# Patient Record
Sex: FEMALE | Race: BLACK OR AFRICAN AMERICAN | Employment: OTHER | URBAN - METROPOLITAN AREA
[De-identification: names, ages, dates, MRNs, and addresses within clinical notes are randomized per-mention and may not be internally consistent; named-entity substitution may affect disease eponyms.]

---

## 2017-01-01 ENCOUNTER — HOSPITAL ENCOUNTER (EMERGENCY)
Age: 82
End: 2017-01-14
Attending: EMERGENCY MEDICINE | Admitting: FAMILY MEDICINE
Payer: MEDICARE

## 2017-01-01 ENCOUNTER — APPOINTMENT (OUTPATIENT)
Dept: GENERAL RADIOLOGY | Age: 82
End: 2017-01-01
Attending: EMERGENCY MEDICINE
Payer: MEDICARE

## 2017-01-01 ENCOUNTER — APPOINTMENT (OUTPATIENT)
Dept: CT IMAGING | Age: 82
End: 2017-01-01
Attending: FAMILY MEDICINE
Payer: MEDICARE

## 2017-01-01 VITALS
OXYGEN SATURATION: 47 % | WEIGHT: 89.9 LBS | RESPIRATION RATE: 7 BRPM | BODY MASS INDEX: 16.99 KG/M2 | TEMPERATURE: 97.7 F | DIASTOLIC BLOOD PRESSURE: 80 MMHG | SYSTOLIC BLOOD PRESSURE: 101 MMHG

## 2017-01-01 DIAGNOSIS — E87.8 HYPOCHLOREMIA: ICD-10-CM

## 2017-01-01 DIAGNOSIS — I46.9 CARDIAC ASYSTOLE (HCC): ICD-10-CM

## 2017-01-01 DIAGNOSIS — N17.9 ACUTE RENAL FAILURE, UNSPECIFIED ACUTE RENAL FAILURE TYPE (HCC): Primary | ICD-10-CM

## 2017-01-01 DIAGNOSIS — N39.0 URINARY TRACT INFECTION WITHOUT HEMATURIA, SITE UNSPECIFIED: ICD-10-CM

## 2017-01-01 DIAGNOSIS — R73.9 HYPERGLYCEMIA: ICD-10-CM

## 2017-01-01 DIAGNOSIS — E86.0 SEVERE DEHYDRATION: ICD-10-CM

## 2017-01-01 LAB
ALBUMIN SERPL BCP-MCNC: 4 G/DL (ref 3.4–5)
ALBUMIN/GLOB SERPL: 1 {RATIO} (ref 0.8–1.7)
ALP SERPL-CCNC: 66 U/L (ref 45–117)
ALT SERPL-CCNC: 16 U/L (ref 13–56)
ANION GAP BLD CALC-SCNC: 10 MMOL/L (ref 3–18)
ANION GAP BLD CALC-SCNC: 14 MMOL/L (ref 3–18)
APPEARANCE UR: ABNORMAL
AST SERPL W P-5'-P-CCNC: 15 U/L (ref 15–37)
BACTERIA URNS QL MICRO: ABNORMAL /HPF
BASOPHILS # BLD AUTO: 0 K/UL (ref 0–0.06)
BASOPHILS # BLD: 0 % (ref 0–2)
BILIRUB DIRECT SERPL-MCNC: 0.4 MG/DL (ref 0–0.2)
BILIRUB SERPL-MCNC: 1.8 MG/DL (ref 0.2–1)
BILIRUB UR QL: NEGATIVE
BUN SERPL-MCNC: 71 MG/DL (ref 7–18)
BUN SERPL-MCNC: 76 MG/DL (ref 7–18)
BUN/CREAT SERPL: 20 (ref 12–20)
BUN/CREAT SERPL: 25 (ref 12–20)
CALCIUM SERPL-MCNC: 10.2 MG/DL (ref 8.5–10.1)
CALCIUM SERPL-MCNC: 8 MG/DL (ref 8.5–10.1)
CALCIUM SERPL-MCNC: 8.2 MG/DL (ref 8.5–10.1)
CHLORIDE SERPL-SCNC: 102 MMOL/L (ref 100–108)
CHLORIDE SERPL-SCNC: 90 MMOL/L (ref 100–108)
CK MB CFR SERPL CALC: 4.9 % (ref 0–4)
CK MB SERPL-MCNC: 5.5 NG/ML (ref 0.5–3.6)
CK SERPL-CCNC: 113 U/L (ref 26–192)
CO2 SERPL-SCNC: 31 MMOL/L (ref 21–32)
CO2 SERPL-SCNC: 34 MMOL/L (ref 21–32)
COLOR UR: ABNORMAL
CREAT SERPL-MCNC: 3.01 MG/DL (ref 0.6–1.3)
CREAT SERPL-MCNC: 3.63 MG/DL (ref 0.6–1.3)
DIFFERENTIAL METHOD BLD: ABNORMAL
EOSINOPHIL # BLD: 0 K/UL (ref 0–0.4)
EOSINOPHIL NFR BLD: 0 % (ref 0–5)
EPITH CASTS URNS QL MICRO: ABNORMAL /LPF (ref 0–5)
ERYTHROCYTE [DISTWIDTH] IN BLOOD BY AUTOMATED COUNT: 12.5 % (ref 11.6–14.5)
GLOBULIN SER CALC-MCNC: 4 G/DL (ref 2–4)
GLUCOSE BLD STRIP.AUTO-MCNC: 192 MG/DL (ref 70–110)
GLUCOSE SERPL-MCNC: 171 MG/DL (ref 74–99)
GLUCOSE SERPL-MCNC: 202 MG/DL (ref 74–99)
GLUCOSE UR STRIP.AUTO-MCNC: NEGATIVE MG/DL
HCT VFR BLD AUTO: 45.9 % (ref 35–45)
HGB BLD-MCNC: 15.2 G/DL (ref 12–16)
HGB UR QL STRIP: NEGATIVE
KETONES UR QL STRIP.AUTO: ABNORMAL MG/DL
LEUKOCYTE ESTERASE UR QL STRIP.AUTO: ABNORMAL
LYMPHOCYTES # BLD AUTO: 5 % (ref 21–52)
LYMPHOCYTES # BLD: 0.4 K/UL (ref 0.9–3.6)
MCH RBC QN AUTO: 29.7 PG (ref 24–34)
MCHC RBC AUTO-ENTMCNC: 33.1 G/DL (ref 31–37)
MCV RBC AUTO: 89.8 FL (ref 74–97)
MONOCYTES # BLD: 0.6 K/UL (ref 0.05–1.2)
MONOCYTES NFR BLD AUTO: 7 % (ref 3–10)
NEUTS SEG # BLD: 8 K/UL (ref 1.8–8)
NEUTS SEG NFR BLD AUTO: 88 % (ref 40–73)
NITRITE UR QL STRIP.AUTO: NEGATIVE
PH UR STRIP: 5 [PH] (ref 5–8)
PLATELET # BLD AUTO: 274 K/UL (ref 135–420)
PMV BLD AUTO: 10.2 FL (ref 9.2–11.8)
POTASSIUM SERPL-SCNC: 3.7 MMOL/L (ref 3.5–5.5)
POTASSIUM SERPL-SCNC: 4.4 MMOL/L (ref 3.5–5.5)
PROT SERPL-MCNC: 8 G/DL (ref 6.4–8.2)
PROT UR STRIP-MCNC: 30 MG/DL
PTH-INTACT SERPL-MCNC: 484.3 PG/ML (ref 14–72)
RBC # BLD AUTO: 5.11 M/UL (ref 4.2–5.3)
RBC #/AREA URNS HPF: NEGATIVE /HPF (ref 0–5)
SODIUM SERPL-SCNC: 138 MMOL/L (ref 136–145)
SODIUM SERPL-SCNC: 143 MMOL/L (ref 136–145)
SP GR UR REFRACTOMETRY: 1.02 (ref 1–1.03)
TROPONIN I SERPL-MCNC: 0.46 NG/ML (ref 0–0.04)
UROBILINOGEN UR QL STRIP.AUTO: 1 EU/DL (ref 0.2–1)
WBC # BLD AUTO: 9.1 K/UL (ref 4.6–13.2)
WBC URNS QL MICRO: ABNORMAL /HPF (ref 0–4)

## 2017-01-01 PROCEDURE — 82550 ASSAY OF CK (CPK): CPT | Performed by: FAMILY MEDICINE

## 2017-01-01 PROCEDURE — 71010 XR CHEST PORT: CPT

## 2017-01-01 PROCEDURE — 74011250636 HC RX REV CODE- 250/636: Performed by: FAMILY MEDICINE

## 2017-01-01 PROCEDURE — 74011250636 HC RX REV CODE- 250/636: Performed by: EMERGENCY MEDICINE

## 2017-01-01 PROCEDURE — 51702 INSERT TEMP BLADDER CATH: CPT

## 2017-01-01 PROCEDURE — 96365 THER/PROPH/DIAG IV INF INIT: CPT

## 2017-01-01 PROCEDURE — 87086 URINE CULTURE/COLONY COUNT: CPT | Performed by: EMERGENCY MEDICINE

## 2017-01-01 PROCEDURE — 82962 GLUCOSE BLOOD TEST: CPT

## 2017-01-01 PROCEDURE — 80048 BASIC METABOLIC PNL TOTAL CA: CPT

## 2017-01-01 PROCEDURE — 81001 URINALYSIS AUTO W/SCOPE: CPT | Performed by: EMERGENCY MEDICINE

## 2017-01-01 PROCEDURE — 80076 HEPATIC FUNCTION PANEL: CPT | Performed by: EMERGENCY MEDICINE

## 2017-01-01 PROCEDURE — 74011000258 HC RX REV CODE- 258

## 2017-01-01 PROCEDURE — 87040 BLOOD CULTURE FOR BACTERIA: CPT | Performed by: EMERGENCY MEDICINE

## 2017-01-01 PROCEDURE — 92610 EVALUATE SWALLOWING FUNCTION: CPT

## 2017-01-01 PROCEDURE — 74011000258 HC RX REV CODE- 258: Performed by: EMERGENCY MEDICINE

## 2017-01-01 PROCEDURE — 96360 HYDRATION IV INFUSION INIT: CPT

## 2017-01-01 PROCEDURE — 93005 ELECTROCARDIOGRAM TRACING: CPT

## 2017-01-01 PROCEDURE — 74011000250 HC RX REV CODE- 250: Performed by: FAMILY MEDICINE

## 2017-01-01 PROCEDURE — 83970 ASSAY OF PARATHORMONE: CPT | Performed by: INTERNAL MEDICINE

## 2017-01-01 PROCEDURE — 96361 HYDRATE IV INFUSION ADD-ON: CPT

## 2017-01-01 PROCEDURE — 80048 BASIC METABOLIC PNL TOTAL CA: CPT | Performed by: INTERNAL MEDICINE

## 2017-01-01 PROCEDURE — 77030005514 HC CATH URETH FOL14 BARD -A

## 2017-01-01 PROCEDURE — 99285 EMERGENCY DEPT VISIT HI MDM: CPT

## 2017-01-01 PROCEDURE — 85025 COMPLETE CBC W/AUTO DIFF WBC: CPT

## 2017-01-01 RX ORDER — ONDANSETRON 2 MG/ML
4 INJECTION INTRAMUSCULAR; INTRAVENOUS
Status: DISCONTINUED | OUTPATIENT
Start: 2017-01-01 | End: 2017-01-01 | Stop reason: HOSPADM

## 2017-01-01 RX ORDER — HEPARIN SODIUM 5000 [USP'U]/ML
5000 INJECTION, SOLUTION INTRAVENOUS; SUBCUTANEOUS EVERY 12 HOURS
Status: DISCONTINUED | OUTPATIENT
Start: 2017-01-01 | End: 2017-01-01 | Stop reason: HOSPADM

## 2017-01-01 RX ORDER — FAMOTIDINE 20 MG/50ML
20 INJECTION, SOLUTION INTRAVENOUS EVERY 12 HOURS
Status: DISCONTINUED | OUTPATIENT
Start: 2017-01-01 | End: 2017-01-01 | Stop reason: DRUGHIGH

## 2017-01-01 RX ORDER — HYDRALAZINE HYDROCHLORIDE 20 MG/ML
20 INJECTION INTRAMUSCULAR; INTRAVENOUS
Status: DISCONTINUED | OUTPATIENT
Start: 2017-01-01 | End: 2017-01-01 | Stop reason: HOSPADM

## 2017-01-01 RX ORDER — DEXTROSE MONOHYDRATE AND SODIUM CHLORIDE 5; .45 G/100ML; G/100ML
100 INJECTION, SOLUTION INTRAVENOUS CONTINUOUS
Status: DISCONTINUED | OUTPATIENT
Start: 2017-01-01 | End: 2017-01-01

## 2017-01-01 RX ORDER — EPINEPHRINE 0.1 MG/ML
INJECTION INTRACARDIAC; INTRAVENOUS
Status: COMPLETED | OUTPATIENT
Start: 2017-01-01 | End: 2017-01-01

## 2017-01-01 RX ORDER — SODIUM CHLORIDE 9 MG/ML
100 INJECTION, SOLUTION INTRAVENOUS CONTINUOUS
Status: DISCONTINUED | OUTPATIENT
Start: 2017-01-01 | End: 2017-01-01

## 2017-01-01 RX ORDER — SODIUM CHLORIDE 450 MG/100ML
100 INJECTION, SOLUTION INTRAVENOUS CONTINUOUS
Status: DISCONTINUED | OUTPATIENT
Start: 2017-01-01 | End: 2017-01-01 | Stop reason: HOSPADM

## 2017-01-01 RX ORDER — CEFTRIAXONE 1 G/1
1 INJECTION, POWDER, FOR SOLUTION INTRAMUSCULAR; INTRAVENOUS
Status: COMPLETED | OUTPATIENT
Start: 2017-01-01 | End: 2017-01-01

## 2017-01-01 RX ORDER — SODIUM CHLORIDE 900 MG/100ML
INJECTION INTRAVENOUS
Status: COMPLETED
Start: 2017-01-01 | End: 2017-01-01

## 2017-01-01 RX ADMIN — SODIUM CHLORIDE 1000 ML: 900 INJECTION, SOLUTION INTRAVENOUS at 18:59

## 2017-01-01 RX ADMIN — SODIUM CHLORIDE 1000 ML: 900 INJECTION, SOLUTION INTRAVENOUS at 18:58

## 2017-01-01 RX ADMIN — FAMOTIDINE 20 MG: 10 INJECTION, SOLUTION INTRAVENOUS at 12:54

## 2017-01-01 RX ADMIN — HYDRALAZINE HYDROCHLORIDE 20 MG: 20 INJECTION INTRAMUSCULAR; INTRAVENOUS at 23:40

## 2017-01-01 RX ADMIN — CEFTRIAXONE 1 G: 1 INJECTION, POWDER, FOR SOLUTION INTRAMUSCULAR; INTRAVENOUS at 12:32

## 2017-01-01 RX ADMIN — ONDANSETRON HYDROCHLORIDE 4 MG: 2 SOLUTION INTRAMUSCULAR; INTRAVENOUS at 00:01

## 2017-01-01 RX ADMIN — EPINEPHRINE 1 MG: 0.1 INJECTION, SOLUTION ENDOTRACHEAL; INTRACARDIAC; INTRAVENOUS at 15:42

## 2017-01-01 RX ADMIN — SODIUM CHLORIDE 50 ML: 900 INJECTION INTRAVENOUS at 12:38

## 2017-01-01 RX ADMIN — ONDANSETRON HYDROCHLORIDE 4 MG: 2 SOLUTION INTRAMUSCULAR; INTRAVENOUS at 15:01

## 2017-01-01 RX ADMIN — CEFTRIAXONE 1 G: 1 INJECTION, POWDER, FOR SOLUTION INTRAMUSCULAR; INTRAVENOUS at 21:09

## 2017-01-01 RX ADMIN — SODIUM CHLORIDE 1000 ML: 900 INJECTION, SOLUTION INTRAVENOUS at 21:55

## 2017-01-01 RX ADMIN — SODIUM CHLORIDE 100 ML/HR: 900 INJECTION, SOLUTION INTRAVENOUS at 00:03

## 2017-01-13 PROBLEM — E86.0 SEVERE DEHYDRATION: Status: ACTIVE | Noted: 2017-01-01

## 2017-01-13 PROBLEM — N17.9 ARF (ACUTE RENAL FAILURE) (HCC): Status: ACTIVE | Noted: 2017-01-01

## 2017-01-13 PROBLEM — R73.9 HYPERGLYCEMIA: Status: ACTIVE | Noted: 2017-01-01

## 2017-01-13 PROBLEM — N39.0 UTI (URINARY TRACT INFECTION): Status: ACTIVE | Noted: 2017-01-01

## 2017-01-13 NOTE — ED NOTES
I performed a brief evaluation, including history and physical, of the patient here in triage and I have determined that pt will need further treatment and evaluation from the main side ER physician. I have placed initial orders to help in expediting patients care.      January 13, 2017 at 2:29 PM - Kermit Wilson PA-C        Visit Vitals    /87 (BP 1 Location: Left arm, BP Patient Position: At rest;Sitting)    Pulse (!) 102    Temp 97.7 °F (36.5 °C)    Resp 14    Wt 40.8 kg (89 lb 14.4 oz)    SpO2 99%    BMI 16.99 kg/m2

## 2017-01-13 NOTE — ED TRIAGE NOTES
Per Daughter, \"Patient has had a loss of appetite over the past week. The patient is drinking very little also. She tries to give her fluids but she just lets her sit. \" Patient is currently on Keflex on that is when the decrease in fluid intake started. The decrease appetite started approximately a week ago.  Patient is on Keflex due to swelling in her wrist.

## 2017-01-14 PROBLEM — F03.90 DEMENTIA (HCC): Status: ACTIVE | Noted: 2017-01-01

## 2017-01-14 NOTE — ED NOTES
Patient provided with dysphagia mechanical diet per SLP order. Daughter at bedside assisting. No other needs at this time.

## 2017-01-14 NOTE — ED NOTES
Assumed care of patient bedside shift report obtained. Patient denies pain. She is reoriented to place and time. Patient has no additional wants or needs.  Patient states that she wants to get up out of bed she is explained that she is not to attempt to get out of bed because she is weak she verbalizes understanding

## 2017-01-14 NOTE — PROGRESS NOTES
Called to see patient due to decreased responsiveness at 1530. Patient had gotten out of bed on her own and fallen. She had vomited and was unresponsive on my examination. She had initially appropriate BP but unable to obtain oxygen saturation. Frequent PVC's and short runs of V-tach on monitor. She had NGT placed due to persistent bilious emesis and was placed on a NRB at 15L. She continued to have bradycardia with short runs of V-tach followed by asystole that did not respond to BVM respirations and CPR. Family was at bedside and decided to terminate resuscitative efforts. Patient was pronounced  at 46. Dr. Zion Bunn was contacted.

## 2017-01-14 NOTE — ED NOTES
Patient placed on Oxygen as she is complaining of shortness of breath, patient states that \"it feels better but i cant breath\" Patient is still vomiting\" Patient reoriented to place and time. Is is able to tell me her name she is asking for her family at this time. Patient cleaned up at this time. No additional wants or needs noted at this time. Family is not present at the bedside, the daughter has left to run an errand patient is currently a Full Code.

## 2017-01-14 NOTE — DISCHARGE SUMMARY
Discharge Summary     Patient: Benedict Chan MRN: [id number]  SSN: [id number]    YOB: 1919  Age: 80 y.o. Sex: female       Admit Date: 2017    Discharge Date: 2017      Admission Diagnoses: ARF (acute renal failure) ([facility]  UTI (urinary tract infection)  Severe dehydration  Hyperglycemia    Discharge Diagnoses:   Problem List as of 2017  Date Reviewed: 2017          Codes Class Noted - Resolved    Dementia ICD-10-CM: F03.90  ICD-9-CM: 294.20  2017 - Present        UTI (urinary tract infection) ICD-10-CM: N39.0  ICD-9-CM: 599.0  2017 - Present        ARF (acute renal failure) ([facility] ICD-10-CM: N17.9  ICD-9-CM: 584.9  2017 - Present        Hyperglycemia ICD-10-CM: R73.9  ICD-9-CM: 790.29  2017 - Present        Severe dehydration ICD-10-CM: E86.0  ICD-9-CM: 276.51  2017 - Present        Sacral fracture ([facility] ICD-10-CM: S32.10XA  ICD-9-CM: 805.6  2014 - Present        Contusion, buttock ICD-10-CM: S30. 0XXA  ICD-9-CM: 922.32  2014 - Present               Discharge Condition:     Hospital Course: Benedict Chan is a 80 y.o.  female who Brought to ER by her daughter c/o generalized weakness and poor appetitie. Daughter states pt is not eating or drinking well in past few days.     Pt was found to have UTI . Pt started on ABT and IV hydration. Shortly after admission becme tachycardiac  She tried to get off bed she feel down . Discussed with dr Annalise Edgar he recommended DNR status .  Pt went into a systole  Dr Unique Lozano tried resusitate her husbend declined aggrrsive measurement code blue stopped and pt      Consults: Nephrology  Disposition:         Signed By: Bina Dang MD     2017

## 2017-01-14 NOTE — ED NOTES
Pt sleeping on stretcher in no apparent distress at this time. Lights dimmed. Warm blankets provided. Call bell in lap. Glass door shut with curtains open for easy monitoring. Family left bedside. All questions answered and care reviewed prior to their leaving. Will continue to monitor while pt awaits inpatient room assignment.

## 2017-01-14 NOTE — PROGRESS NOTES
responded to Code for  Malgorzata, who is a 80 y.o.,female,     The  provided the following Interventions:  Provided crisis pastoral care and pastoral support. Offered prayers on behalf for the patient. Chart reviewed. The following outcomes were achieved:  Resuscitation efforts were unsuccessful     Assessment:  There are no spiritual or Advent issues which require intervention at this time. Plan:  Chaplains will continue to follow and will provide pastoral care on an as needed/requested basis.  recommends bedside caregivers page  on duty if patient shows signs of acute spiritual or emotional distress.

## 2017-01-14 NOTE — ED NOTES
Pt standing next to stretcher. Pt pulled out IV. Cath intact. Blood on floor and pt. Pt actively 40 [facility]. Pt stated, \"I'm cold. Take this out. \" Pt removed her monitor and gown. Pt cleaned, floor cleaned, linens changed, and monitor replaced on pt. Door open to ensure safe monitoring. Alarms on. Will continue to monitor.

## 2017-01-14 NOTE — ED NOTES
Medication given per [facility], Patient is resting well and a new IV is placed at this time.

## 2017-01-14 NOTE — PROGRESS NOTES
Problem: Dysphagia (Adult)  Goal: *Acute Goals and Plan of Care (Insert Text)  Patient will:  1. Tolerate PO trials with 0 s/s overt distress in 4/5 trials  2. Utilize compensatory swallow strategies/maneuvers (decrease bite/sip, size/rate, alt. liq/sol) with min cues in 4/5 trials    Rec:   Mech-soft diet with thin liquids  Aspiration precautions  HOB >45 during po intake, remain >30 for 30-45 minutes after po   Small bites/sips; alternate liquid/solid with slow feeding rate   Oral care TID  Meds per pt preference  [address] EVALUATION     Patient: Tino Araujo (55 y.o. female)  Date: 1/14/2017  Primary Diagnosis: ARF (acute renal failure) ([facility])  UTI (urinary tract infection)  Severe dehydration  Hyperglycemia        Precautions: aspiration         ASSESSMENT :  Based on the objective data described below, the patient presents with mild oral dysphagia c/b increased mastication and delayed a-p transit of reg solid consistency. Pt tolerated reg solid, puree, and thin liquids +/- straw with positive oral clearance and no overt s/sx of aspiration. Laryngeal elevation appeared functional and timely to palpation. Recommend initiation of mech soft diet with thin liquids, aspiration precautions, oral care TID, and meds as tolerated. ST will continue to follow to ensure safety of PO. Patient will benefit from skilled intervention to address the above impairments. Patients rehabilitation potential is considered to be Good  Factors which may influence rehabilitation potential include:   [X]            None noted       PLAN :  Recommendations and Planned Interventions: See above  Frequency/Duration: Patient will be followed by speech-language pathology 3-4 times a week to address goals. Discharge Recommendations: Home Health and To Be Determined       SUBJECTIVE:   Patient stated I just keep loving and being loved. OBJECTIVE:   No past medical history on file. No past surgical history on file. Prior Level of Function/Home Situation: unknown     Diet prior to admission: soft solid with thin per daughter  Current Diet:  [facility] soft with thin   Cognitive and Communication Status:  Neurologic State: Alert  Orientation Level: Oriented to person  Cognition: Follows commands           Oral Assessment:  Oral Assessment  Labial: No impairment  Dentition: Intact  Oral Hygiene: Adequate  Lingual: No impairment  Velum: No impairment  Mandible: No impairment  P.O. Trials:  Patient Position: 50 at [facility]  Vocal quality prior to P.O.: No impairment  Consistency Presented: Thin liquid; Solid;Puree  How Presented: Self-fed/presented;Cup/sip;Spoon;Straw     Bolus Acceptance: No impairment  Bolus Formation/Control: Impaired  Type of Impairment: Delayed;Mastication  Propulsion: Delayed (# of seconds)  Oral Residue: None  Initiation of Swallow: No impairment  Laryngeal Elevation: Functional  Aspiration Signs/Symptoms: None  Pharyngeal Phase Characteristics: No impairment, issues, or problems   Effective Modifications: None  Cues for Modifications: None        Oral Phase Severity: Mild  Pharyngeal Phase Severity : No impairment     GCODESwallowing:  Swallow Current Status CI= 1-19%   Swallow Goal Status CH= 0%     The severity rating is based on the following outcomes:             Clinical Judgment     Pain:  Pt c/o 0/10 pain prior to evaluation. Pt c/o 0/10 pain post evaluation.      After treatment:   [ ]            Patient left in no apparent distress sitting up in chair  [X]            Patient left in no apparent distress in bed  [X]            Call bell left within reach  [X]            Nursing notified  [ ]            Caregiver present  [ ]            Bed alarm activated      COMMUNICATION/EDUCATION:   [X]            SLP educated pt's family with regard to compensatory swallow strategies and                  aspiration/reflux precautions including: small bites/sips,                  alternate liquids/solids, decrease feeding rate, HOB > 45 with all po, and                             upright in bed at 30 degrees after po for at least 45 minutes. [X]            Patient/family have participated as able in goal setting and plan of care.      Thank you for this referral.     Aletha Wade M.S. CCC-SLP/L  Speech-Language Pathologist

## 2017-01-14 NOTE — ED NOTES
Bedside shift change report given to [facility] RN and Barbara RN (oncoming nurse) by Antoine Xavier RN   (offgoing nurse). Report included the following information SBAR, ED Summary, Procedure Summary, MAR, Recent Results and Cardiac Rhythm Sinus tach.

## 2017-01-14 NOTE — CONSULTS
Consult Note  Consult requested by: dr Sergei Julio is a 80 y.o. female [address] who is being seen on consult for arf  Chief Complaint   Patient presents with    Decreased Appetite     Admission diagnosis: <principal problem not specified>     HPI: 80 y o  [place] female brought to er for dehydration,poor appetite,wt loss. asked to evaluate for arf.she had received ivf in er,phan cath was placed. no hx of nsaids ,other nephrotoxic meds. hx of htn.never been admitted to the hospital according to daughter  No past medical history on file. No past surgical history on file. Social History     Social History    Marital status:      Spouse name: N/A    Number of children: N/A    Years of education: N/A     Occupational History    Not on file. Social History Main Topics    Smoking status: Never Smoker    Smokeless tobacco: Never Used    Alcohol use No    Drug use: Not on file    Sexual activity: No     Other Topics Concern    Not on file     Social History Narrative       No family history on file. No Known Allergies     Home Medications:     None       Current Facility-Administered Medications   Medication Dose Route Frequency    famotidine (PF) (PEPCID) 20 mg in sodium chloride 0.9 % 10 mL injection  20 mg IntraVENous DAILY    dextrose 5 % - 0.45% NaCl infusion  100 mL/hr IntraVENous CONTINUOUS    hydrALAZINE (APRESOLINE) 20 mg/mL injection 20 mg  20 mg IntraVENous Q4H PRN    ondansetron (ZOFRAN) injection 4 mg  4 mg IntraVENous Q8H PRN     No current outpatient prescriptions on file. Review of Systems:   A comprehensive review of systems was negative except for that written in the HPI.   Data Review:    Labs: Results:       Chemistry Recent Labs      01/14/17   0840  01/13/17   1641   GLU  171*  202*   NA  143  138   K  3.7  4.4   CL  102  90*   CO2  31  34*   BUN  76*  71*   CREA  3.01*  3.63*   CA  8.0*  10.2*   AGAP  10  14   BUCR  25*  20 AP   --   66   TP   --   8.0   ALB   --   4.0   GLOB   --   4.0   AGRAT   --   1.0      PTH  Lab Results   Component Value Date/Time    Calcium 8.0 01/14/2017 08:40 AM      CBC w/Diff Recent Labs      01/13/17   1641   WBC  9.1   RBC  5.11   HGB  15.2   HCT  45.9*   PLT  274   GRANS  88*   LYMPH  5*   EOS  0      Coagulation No results for input(s): PTP, INR, APTT in the last 72 hours. No lab exists for component: INREXT    Iron/Ferritin No results for input(s): IRON in the last 72 hours. No lab exists for component: TIBCCALC   BNP No results for input(s): BNPP in the last 72 hours. Cardiac Enzymes No results for input(s): CPK, CKND1, CHANTEL in the last 72 hours. No lab exists for component: CKRMB, TROIP   Liver Enzymes Recent Labs      01/13/17   1641   TP  8.0   ALB  4.0   AP  66   SGOT  15      Thyroid Studies Lab Results   Component Value Date/Time    TSH 3.32 07/13/2010 09:00 AM        Urinalysis Lab Results   Component Value Date/Time    Color DARK YELLOW 01/13/2017 06:35 PM    Appearance CLOUDY 01/13/2017 06:35 PM    Specific gravity 1.020 01/13/2017 06:35 PM    pH (UA) 5.0 01/13/2017 06:35 PM    Protein 30 01/13/2017 06:35 PM    Glucose NEGATIVE  01/13/2017 06:35 PM    Ketone TRACE 01/13/2017 06:35 PM    Bilirubin NEGATIVE  01/13/2017 06:35 PM    Urobilinogen 1.0 01/13/2017 06:35 PM    Nitrites NEGATIVE  01/13/2017 06:35 PM    Leukocyte Esterase MODERATE 01/13/2017 06:35 PM    Epithelial cells 1+ 01/13/2017 06:35 PM    Bacteria FEW 01/13/2017 06:35 PM    WBC 5 to 10 01/13/2017 06:35 PM    RBC NEGATIVE  01/13/2017 06:35 PM         IMAGES:   XR Results (maximum last 3): Results from [place] encounter on 01/13/17   XR CHEST PORT   Narrative Portable chest x-ray     CPT code 07468     INDICATION: Weakness and malaise    COMPARISON: Chest x-ray 1/9/2014    FINDINGS: Frontal view of the chest obtained at 1904 hours. The cardiac  silhouette is normal. Aortic ectasia is stable.   The pulmonary vascular markings  are normal. The lungs demonstrate diffuse hyperinflation. There are  calcifications throughout the tracheobronchial tree. No mass or infiltrate. No  evidence of pleural effusion. There is no pneumothorax. The osseous  structures are diffusely demineralized. No focal osseous lesions. Impression IMPRESSION:    1.  Pulmonary hyperinflation. No acute pulmonary process. 2.  Stable aortic ectasia. Results from [place] encounter on 01/31/16   XR ANKLE RT MIN 3 V   Narrative EXAM:  XR ANKLE RT MIN 3 V    INDICATION:  pain    COMPARISON: Foot right 1/6/2011. FINDINGS: No acute displaced fracture or dislocation. Ankle mortise is intact. Talar dome is smooth. Hypertrophic change anterior talus and navicular bone,  most likely degenerative. Please correlate clinically for infection. Plantar  calcaneal and Achilles tendon spur. Impression IMPRESSION: No acute displaced fracture or dislocation. Hypertrophic changes anterior talus and navicular bone as above. Results from [place] encounter on 12/12/14   XR SACRUM AND COCCYX   Narrative SACRUM/COCCYX    CPT CODE: 01818    HISTORY: Pain post fall. FINDINGS: AP and lateral views. Bony mineralization, alignment and joint spaces  are unremarkable. There is no evidence of fracture or other significant bony  abnormality. The foraminal arcs are intact. The coccyx is unremarkable. Impression IMPRESSION:    No definite evidence of acute bony injury. CT Results (maximum last 3): Results from [place] encounter on 07/11/16   CT SPINE CERV WO CONT   Narrative CT Cervical Spine    CPT CODE: 11591    VOLUMETRIC DATA WAS ACQUIRED THROUGH THE CERVICAL SPINE REGION WITH A MULTISLICE  SCANNER. THIS WAS RECONSTRUCTED IN THE AXIAL CORONAL AND SAGITTAL PLANES. INDICATION: No priors. COMPARISON: Neck pain and stiffness. FINDINGS:      .   There is mild reversal of the normal cervical lordosis in the lower segments. There is diffuse disc space narrowing with marginal osteophyte formation most  severe at 3-C4 and C5-C6. There is diffuse severe facet arthropathy. There is  extensive abnormal calcification the region of the transverse ligament and joint  space between the odontoid and anterior arch of C1. Ethelene Gauss No osteolytic or osteoblastic lesions are noted. Apical fibronodular opacities are seen in the lungs bilaterally. There are no abnormal calcifications in the sternoclavicular joints  bilaterally. .  Bone mineralization seems generally decreased. .         Impression IMPRESSION:    Severe extensive spondylosis without fracture or subluxation. 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 All CT scans at this facility are performed using dose optimization technique as  appropriate to the performed exam, to include automated exposure control,  adjustment of the mA and/or kV according to patient's size (Including  appropriate matching for site-specific examinations), or use of iterative  reconstruction technique. MRI Results (maximum last 3): No results found for this or any previous visit. Nuclear Medicine Results (maximum last 3): No results found for this or any previous visit. US Results (maximum last 3): No results found for this or any previous visit. DEXA Results (maximum last 3): No results found for this or any previous visit. SERGIO Results (maximum last 3): No results found for this or any previous visit. IR Results (maximum last 3): No results found for this or any previous visit. VAS/US Results (maximum last 3): No results found for this or any previous visit. PET Results (maximum last 3): No results found for this or any previous visit. No results found for this or any previous visit.   @[facility]([id number])    CULTURE: )  Recent Labs      01/13/17 2055 01/13/17 2040 01/13/17   1835   CULT  NO GROWTH AFTER 9 HOURS  NO GROWTH AFTER 9 HOURS  NO GROWTH AFTER 14 HOURS     Recent Labs      01/13/17 2055 01/13/17 2040 01/13/17   1835   CULT  NO GROWTH AFTER 9 HOURS  NO GROWTH AFTER 9 HOURS  NO GROWTH AFTER 14 HOURS       Physical Assessment:     Visit Vitals    /66    Pulse (!) 116    Temp 97.7 °F (36.5 °C)    Resp 29    Wt 40.8 kg (89 lb 14.4 oz)    SpO2 95%    BMI 16.99 kg/m2     Last 3 Recorded Weights in this Encounter    01/13/17 1423   Weight: 40.8 kg (89 lb 14.4 oz)       Intake/Output Summary (Last 24 hours) at 01/14/17 1405  Last data filed at 01/14/17 0100   Gross per 24 hour   Intake                0 ml   Output               60 ml   Net              -60 ml       Physial Exam:  General appearance: alert, cooperative, no distress, appears stated age  Skin: normal coloration and turgor, no rashes, no suspicious skin lesions noted. HEENT: Head; normocephalic, atraumatic. XOCHILT. ENT- ENT exam normal, no neck nodes or sinus tenderness. Lungs: clear to auscultation bilaterally  Heart: regular rate and rhythm, S1, S2 normal, no murmur, click, rub or gallop  Abdomen: soft, non-tender. Bowel sounds normal. No masses,  no organomegaly  Extremities: extremities normal, atraumatic, no cyanosis or edema    PLAN / RECOMMENDATION:    Arf,reviewed medical records and labs,discussed with daughter,most likely related to dehydration. mild improvement overnight with ivf.check urine lytes and renal ultrasound. continue ivf  Htn,controlled  Mild hyperglycemia     Thank you for the consultation to participate in patient's care. I have personally discussed my plan with the referring physician.      Olamide Healy MD  January 14, 2017

## 2017-01-14 NOTE — ED NOTES
Patient found standing at foot of bed with monitor still attached, vomiting. Patient verbally directed back to bed, had a period of confusion where she appeared to be awake but not responding to anyone. Patient continues to vomit in bed and is also tachycardic and with increased work of breathing. EKG and cardiac panel ordered, zofran administered. Dr. Laura Joiner paged.

## 2017-01-14 NOTE — ED PROVIDER NOTES
HPI Comments: Pt is a 79 yo F here with her daughter and . She c/o generalized weakness and poor appetitie. Daughter states pt is not eating or drinking well in past few days. Pt denies cough, fever, abdominal pain, abdominal pain, chills or dysuria. No cp, sob, dizziness or vomiting. No other complaints. Pmhx CAD and cellulitis. No past medical history on file. No past surgical history on file. No family history on file. Social History     Social History    Marital status:      Spouse name: N/A    Number of children: N/A    Years of education: N/A     Occupational History    Not on file. Social History Main Topics    Smoking status: Never Smoker    Smokeless tobacco: Never Used    Alcohol use No    Drug use: Not on file    Sexual activity: No     Other Topics Concern    Not on file     Social History Narrative         ALLERGIES: Review of patient's allergies indicates no known allergies. Review of Systems   Constitutional: Positive for appetite change. Negative for chills and fever. HENT: Negative for congestion, postnasal drip, sore throat and trouble swallowing. Eyes: Negative for visual disturbance. Respiratory: Negative for cough and wheezing. Gastrointestinal: Negative for abdominal pain, nausea and vomiting. Endocrine: Negative for polyphagia and polyuria. Genitourinary: Negative for difficulty urinating and dysuria. Musculoskeletal: Negative for arthralgias and myalgias. Skin: Negative for rash and wound. Neurological: Positive for weakness. Negative for dizziness and headaches. Psychiatric/Behavioral: Negative for agitation and confusion. All other systems reviewed and are negative. Vitals:    01/13/17 1915 01/13/17 2015 01/13/17 2045 01/13/17 2130   BP:  168/84 (!) 178/91 174/79   Pulse: 88 97 94 87   Resp: 27 28 25 24   Temp:       SpO2: 97% 97% 97% 95%   Weight:                Physical Exam   Constitutional: No distress.    Frail appearing   HENT:   Head: Normocephalic and atraumatic. Nose: Nose normal.   Mouth/Throat: No oropharyngeal exudate. Dry mucus membranes   Neck: Normal range of motion. Neck supple. Cardiovascular: Normal rate and intact distal pulses. Capillary refill <3 seconds   Pulmonary/Chest: Effort normal and breath sounds normal. No stridor. No respiratory distress. She has no wheezes. She has no rales. Abdominal: Soft. Bowel sounds are normal. She exhibits no distension and no mass. There is no tenderness. Lymphadenopathy:     She has no cervical adenopathy. Skin: She is not diaphoretic. Nursing note and vitals reviewed. MDM  Number of Diagnoses or Management Options  Acute renal failure, unspecified acute renal failure type [facility]): Hyperglycemia:   Hypochloremia:   Severe dehydration:   Urinary tract infection without hematuria, site unspecified:   Diagnosis management comments: ddx metabolic, infectious, dehydration    Bun 71 Cr 3.7  Emergent IVF boluses    Has UTI, culture, started rocephin ivpb    Gave 2L IVF. Only about 15cc urine output. [place]             Amount and/or Complexity of Data Reviewed  Clinical lab tests: ordered and reviewed  Tests in the radiology section of CPT®: ordered and reviewed  Tests in the medicine section of CPT®: reviewed and ordered  Discussion of test results with the performing providers: yes  Discuss the patient with other providers: yes    Risk of Complications, Morbidity, and/or Mortality  Presenting problems: high  Diagnostic procedures: high  Management options: high    Critical Care  Total time providing critical care: 30-74 minutes    Critical care time:   I have spent 30 minutes of critical care time involved in lab review, consultations with specialist, family decision making, and documentation. During this entire length of time I was immediately available to the patient. Critical care:  The reason for providing this level of medical care for this critically ill patient was due to a critical illness that impaired one or more vital organ systems such that there was a high probability of imminent or life threatening deterioration in the patients condition. This care involved high complexity decision making to assess, manipulate and support vital system functions, to treat this degree vital organ system failure and to prevent further life threatening deterioration of the patient's condition. ED Course       Procedures      Vitals:  Patient Vitals for the past 12 hrs:   Temp Pulse Resp BP SpO2   01/13/17 2130 - 87 24 174/79 95 %   01/13/17 2045 - 94 25 (!) 178/91 97 %   01/13/17 2015 - 97 28 168/84 97 %   01/13/17 1915 - 88 27 - 97 %   01/13/17 1900 - 92 28 - 97 %   01/13/17 1423 97.7 °F (36.5 °C) (!) 102 14 144/87 99 %         Medications ordered:   Medications   0.9% sodium chloride infusion (not administered)   sodium chloride 0.9 % bolus infusion 1,000 mL (1,000 mL IntraVENous New Bag 1/13/17 2155)   sodium chloride 0.9 % bolus infusion 1,000 mL (0 mL IntraVENous IV Completed 1/13/17 1959)   cefTRIAXone (ROCEPHIN) 1 g in 0.9% sodium chloride (MBP/ADV) 50 mL MBP (0 g IntraVENous IV Completed 1/13/17 2139)         Lab findings:  Recent Results (from the past 12 hour(s))   CBC WITH AUTOMATED DIFF    Collection Time: 01/13/17  4:41 PM   Result Value Ref Range    WBC 9.1 4.6 - 13.2 K/uL    RBC 5.11 4.20 - 5.30 M/uL    HGB 15.2 12.0 - 16.0 g/dL    HCT 45.9 (H) 35.0 - 45.0 %    MCV 89.8 74.0 - 97.0 FL    MCH 29.7 24.0 - 34.0 PG    MCHC 33.1 31.0 - 37.0 g/dL    RDW 12.5 11.6 - 14.5 %    PLATELET 712 816 - 526 K/uL    MPV 10.2 9.2 - 11.8 FL    NEUTROPHILS 88 (H) 40 - 73 %    LYMPHOCYTES 5 (L) 21 - 52 %    MONOCYTES 7 3 - 10 %    EOSINOPHILS 0 0 - 5 %    BASOPHILS 0 0 - 2 %    ABS. NEUTROPHILS 8.0 1.8 - 8.0 K/UL    ABS. LYMPHOCYTES 0.4 (L) 0.9 - 3.6 K/UL    ABS. MONOCYTES 0.6 0.05 - 1.2 K/UL    ABS. EOSINOPHILS 0.0 0.0 - 0.4 K/UL    ABS.  BASOPHILS 0.0 0.0 - 0.06 K/UL    DF AUTOMATED     METABOLIC PANEL, BASIC    Collection Time: 01/13/17  4:41 PM   Result Value Ref Range    Sodium 138 136 - 145 mmol/L    Potassium 4.4 3.5 - 5.5 mmol/L    Chloride 90 (L) 100 - 108 mmol/L    CO2 34 (H) 21 - 32 mmol/L    Anion gap 14 3.0 - 18 mmol/L    Glucose 202 (H) 74 - 99 mg/dL    BUN 71 (H) 7.0 - 18 MG/DL    Creatinine 3.63 (H) 0.6 - 1.3 MG/DL    BUN/Creatinine ratio 20 12 - 20      GFR est AA 14 (L) >60 ml/min/1.73m2    GFR est non-AA 12 (L) >60 ml/min/1.73m2    Calcium 10.2 (H) 8.5 - 10.1 MG/DL   HEPATIC FUNCTION PANEL    Collection Time: 01/13/17  4:41 PM   Result Value Ref Range    Protein, total 8.0 6.4 - 8.2 g/dL    Albumin 4.0 3.4 - 5.0 g/dL    Globulin 4.0 2.0 - 4.0 g/dL    A-G Ratio 1.0 0.8 - 1.7      Bilirubin, total 1.8 (H) 0.2 - 1.0 MG/DL    Bilirubin, direct 0.4 (H) 0.0 - 0.2 MG/DL    Alk. phosphatase 66 45 - 117 U/L    AST 15 15 - 37 U/L    ALT 16 13 - 56 U/L   URINALYSIS W/ RFLX MICROSCOPIC    Collection Time: 01/13/17  6:35 PM   Result Value Ref Range    Color DARK YELLOW      Appearance CLOUDY      Specific gravity 1.020 1.005 - 1.030      pH (UA) 5.0 5.0 - 8.0      Protein 30 (A) NEG mg/dL    Glucose NEGATIVE  NEG mg/dL    Ketone TRACE (A) NEG mg/dL    Bilirubin NEGATIVE  NEG      Blood NEGATIVE  NEG      Urobilinogen 1.0 0.2 - 1.0 EU/dL    Nitrites NEGATIVE  NEG      Leukocyte Esterase MODERATE (A) NEG     URINE MICROSCOPIC ONLY    Collection Time: 01/13/17  6:35 PM   Result Value Ref Range    WBC 5 to 10 0 - 4 /hpf    RBC NEGATIVE  0 - 5 /hpf    Epithelial cells 1+ 0 - 5 /lpf    Bacteria FEW (A) NEG /hpf       EKG interpretation by [employer], DO:      X-Ray, CT or other radiology findings or impressions:  XR CHEST PORT   Final Result   IMPRESSION:     1. Pulmonary hyperinflation. No acute pulmonary process. 2. Stable aortic ectasia. Progress notes, Consult notes or additional Procedure notes:   7:44 PM Discussed care with Dr. Charo Gonzalez, Specialty: Nephrology. Standard discussion; including history of patients chief complaint, available diagnostic results and treatment course. He agrees with the amount of fluids given. If there is an abnormal bladder scan, he reccomends phan catheter placement. He will see the pt tomorrow. Discussed results and case with Dr. Karla Rosas who accepts admit and requests put under Dr. Catherine Sanders. Reevaluation of patient:   Resting, alert, wants food. Disposition:  Diagnosis:   1. Acute renal failure, unspecified acute renal failure type ([address]    2. Urinary tract infection without hematuria, site unspecified    3. Severe dehydration    4. Hypochloremia    5. Hyperglycemia        Disposition:admitted    Follow-up Information     None           Patient's Medications    No medications on file         Sue Montalvo. 74 scribing for and in the presence of Shayla Ellis,   01/13/17. Signed by: Sue Lujan 01/13/17, 7:44 PM    Physician Attestation  I personally performed the services described in the documentation, reviewed the documentation, as recorded by the scribe in my presence, and it accurately and completely records my words and actions.     Shayla Ellis,  01/13/17

## 2017-01-14 NOTE — ED NOTES
Patient's belongings (upper and lower dentures, glasses, and three rings) placed in patient belongings bag and labeled. Family notified of belongings being in the ER ready for .

## 2017-01-14 NOTE — ED NOTES
Dr. Chris Toscano called back, informed of all previously noted. Patient also noted to be back off central monitor. Filipe Martinez, sent to room to place patient back on central monitor. Patient found to have gotten out of bed and fallen. Decreased LOC. MD notified. Verbal order for CT Head placed. Daughter at bedside.

## 2017-01-15 LAB
ATRIAL RATE: 132 BPM
BACTERIA SPEC CULT: NORMAL
CALCULATED P AXIS, ECG09: 59 DEGREES
CALCULATED R AXIS, ECG10: -72 DEGREES
CALCULATED T AXIS, ECG11: 99 DEGREES
DIAGNOSIS, 93000: NORMAL
P-R INTERVAL, ECG05: 118 MS
Q-T INTERVAL, ECG07: 336 MS
QRS DURATION, ECG06: 92 MS
QTC CALCULATION (BEZET), ECG08: 497 MS
SERVICE CMNT-IMP: NORMAL
VENTRICULAR RATE, ECG03: 132 BPM

## 2017-01-15 NOTE — ED NOTES
Patient vomiting copious amounts of vomit, continues to have decreased LOC, shallow respirations. ED MD Dr. Diego Baron called to room as admitting physician had not returned page about patient's worsening condition. Dr. Diego Baron at bedside assessing patient, speaking with family. Patient respirations assisted with BVM, see Code Narrator.

## 2017-01-19 LAB
BACTERIA SPEC CULT: NORMAL
BACTERIA SPEC CULT: NORMAL
SERVICE CMNT-IMP: NORMAL
SERVICE CMNT-IMP: NORMAL

## 2025-06-21 NOTE — H&P
History & Physical    Patient: Benedict Chan MRN: [id number]  CSN: [id number]    YOB: 1919  Age: 80 y.o. Sex: female      DOA: 1/13/2017    Chief Complaint:   Chief Complaint   Patient presents with    Decreased Appetite          HPI:     Benedict Chan is a 80 y.o.  female who  Brought to ER by her daughter c/o generalized weakness and poor appetitie. Daughter states pt is not eating or drinking well in past few days. Pt denies cough, fever, abdominal pain, abdominal pain, chills or dysuria. No cp, sob, dizziness or vomiting. No other complaints. Pmhx CAD and cellulitis. Pt alert  And confused has h/o Dementia . Pt reported difficult swallowing swallow evaluation was ordered this morning     past medical history   Hypertension  Dementia  CAD     past surgical history  none  family history   None contributory , hypertension runs in the family    Social History     Social History    Marital status:      Spouse name: N/A    Number of children: N/A    Years of education: N/A     Social History Main Topics    Smoking status: Never Smoker    Smokeless tobacco: Never Used    Alcohol use No    Drug use: Not on file    Sexual activity: No     Other Topics Concern    Not on file     Social History Narrative       Prior to Admission medications    Not on File       No Known Allergies    Review of Systems  GENERAL: Patient alert, awake and confused  able to say few wards  HEENT: No change in vision, no earache, tinnitus, sore throat or sinus congestion. NECK: No pain or stiffness. CARDIOVASCULAR: No chest pain or pressure. No palpitations. PULMONARY: No shortness of breath, cough or wheeze. GASTROINTESTINAL: No abdominal pain, nausea, vomiting or diarrhea, melena or       bright red blood per rectum. GENITOURINARY: No urinary frequency, urgency, hesitancy or dysuria. MUSCULOSKELETAL: No joint or muscle pain, no back pain, no recent trauma. DERMATOLOGIC: No rash, no itching, no lesions. ENDOCRINE: No polyuria, polydipsia, no heat or cold intolerance. No recent change in    weight. HEMATOLOGICAL: No anemia or easy bruising or bleeding. NEUROLOGIC: No headache, seizures, numbness. generalized weakness   PSYCHIATRIC: Dementia        Physical Exam:     Physical Exam:  Visit Vitals    /64    Pulse (!) 115    Temp 97.7 °F (36.5 °C)    Resp (!) 37    Wt 40.8 kg (89 lb 14.4 oz)    SpO2 93%    BMI 16.99 kg/m2      O2 Device: Room air    Temp (24hrs), Av.7 °F (36.5 °C), Min:97.7 °F (36.5 °C), Max:97.7 °F (36.5 °C)        1901 -  0700  In: -   Out: 60 [Urine:60]    General:  Alert, decrease speech, cooperative, no distress, appears stated age. Head:  Normocephalic, without obvious abnormality, atraumatic. Eyes:  Conjunctivae/corneas clear. PERRL, EOMs intact. Nose: Nares normal. No drainage or sinus tenderness. Throat: Lips, mucosa, and tongue dry   Neck: Supple, symmetrical, trachea midline, no adenopathy, thyroid: no enlargement/tenderness/nodules, no carotid bruit and no JVD. Back:   ROM normal. No CVA tenderness. Lungs:   Clear to auscultation bilaterally. Chest wall:  No tenderness or deformity. Heart:  Regular rate and rhythm, S1, S2 normal, systolic murmur, click, rub or gallop. Abdomen: Soft, non-tender. Bowel sounds normal. No masses,  No organomegaly. Extremities: Extremities normal, atraumatic, no cyanosis or edema. Pulses: 2+ and symmetric all extremities. Skin: Skin color, texture, turgor normal. No rashes or lesions   Neurologic: CNII-XII intact. No focal motor or sensory deficit. Labs Reviewed: All lab results for the last 24 hours reviewed.   CXR and EKG    Procedures/imaging: see electronic medical records for all procedures/Xrays and details which were not copied into this note but were reviewed prior to creation of Plan        Assessment/Plan     Active Problems:    UTI (urinary tract infection) (1/13/2017)      ARF (acute renal failure) ([facility] [address] (1/13/2017)      Hyperglycemia (1/13/2017)      Severe dehydration (1/13/2017)     Dementia    Plan  Will admit to telemtry floor  Continue rocephin 1 gm IV f/u urine culture and blood culture  IV hydration   Check urine electrolyte and renal ultrasound  Speech evaluation and therapy  Monitor BP, hydralazine prn  zofran prn for vomiting  Continue to monitor lab cbc, cmp, mag, tsh  Echo for further evaluation for systolic murmer      DVT/GI Prophylaxis: Hep SQ, SCD's and H2B/PPI      Titi Dick MD  1/14/2017  2:15 PM No